# Patient Record
Sex: FEMALE | Race: WHITE | NOT HISPANIC OR LATINO
[De-identification: names, ages, dates, MRNs, and addresses within clinical notes are randomized per-mention and may not be internally consistent; named-entity substitution may affect disease eponyms.]

---
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
## 2020-09-13 ENCOUNTER — TRANSCRIPTION ENCOUNTER (OUTPATIENT)
Age: 20
End: 2020-09-13

## 2021-11-10 ENCOUNTER — INPATIENT (INPATIENT)
Facility: HOSPITAL | Age: 21
LOS: 1 days | Discharge: HOME | DRG: 310 | End: 2021-11-12
Attending: INTERNAL MEDICINE | Admitting: INTERNAL MEDICINE
Payer: COMMERCIAL

## 2021-11-10 VITALS
WEIGHT: 100.09 LBS | SYSTOLIC BLOOD PRESSURE: 113 MMHG | OXYGEN SATURATION: 100 % | TEMPERATURE: 98 F | HEART RATE: 140 BPM | DIASTOLIC BLOOD PRESSURE: 70 MMHG | RESPIRATION RATE: 16 BRPM

## 2021-11-10 LAB
APPEARANCE UR: CLEAR — SIGNIFICANT CHANGE UP
BACTERIA # UR AUTO: NEGATIVE — SIGNIFICANT CHANGE UP
BILIRUB UR-MCNC: NEGATIVE — SIGNIFICANT CHANGE UP
COLOR SPEC: COLORLESS — SIGNIFICANT CHANGE UP
DIFF PNL FLD: NEGATIVE — SIGNIFICANT CHANGE UP
EPI CELLS # UR: 2 /HPF — SIGNIFICANT CHANGE UP
GAS PNL BLDV: SIGNIFICANT CHANGE UP
GLUCOSE UR QL: NEGATIVE — SIGNIFICANT CHANGE UP
HYALINE CASTS # UR AUTO: 0 /LPF — SIGNIFICANT CHANGE UP (ref 0–2)
KETONES UR-MCNC: ABNORMAL
LEUKOCYTE ESTERASE UR-ACNC: NEGATIVE — SIGNIFICANT CHANGE UP
NITRITE UR-MCNC: NEGATIVE — SIGNIFICANT CHANGE UP
PH UR: 7 — SIGNIFICANT CHANGE UP (ref 5–8)
PROT UR-MCNC: NEGATIVE — SIGNIFICANT CHANGE UP
RBC CASTS # UR COMP ASSIST: 1 /HPF — SIGNIFICANT CHANGE UP (ref 0–4)
SP GR SPEC: 1.02 — SIGNIFICANT CHANGE UP (ref 1.01–1.02)
TROPONIN T, HIGH SENSITIVITY RESULT: <6 NG/L — SIGNIFICANT CHANGE UP (ref 0–51)
UROBILINOGEN FLD QL: NEGATIVE — SIGNIFICANT CHANGE UP
WBC UR QL: 2 /HPF — SIGNIFICANT CHANGE UP (ref 0–5)

## 2021-11-10 PROCEDURE — 93010 ELECTROCARDIOGRAM REPORT: CPT

## 2021-11-10 PROCEDURE — 99220: CPT | Mod: 25

## 2021-11-10 PROCEDURE — 71275 CT ANGIOGRAPHY CHEST: CPT | Mod: 26

## 2021-11-10 RX ORDER — VANCOMYCIN HCL 1 G
1000 VIAL (EA) INTRAVENOUS ONCE
Refills: 0 | Status: COMPLETED | OUTPATIENT
Start: 2021-11-10 | End: 2021-11-10

## 2021-11-10 RX ORDER — SODIUM CHLORIDE 9 MG/ML
2000 INJECTION INTRAMUSCULAR; INTRAVENOUS; SUBCUTANEOUS ONCE
Refills: 0 | Status: COMPLETED | OUTPATIENT
Start: 2021-11-10 | End: 2021-11-10

## 2021-11-10 RX ORDER — ACETAMINOPHEN 500 MG
975 TABLET ORAL ONCE
Refills: 0 | Status: COMPLETED | OUTPATIENT
Start: 2021-11-10 | End: 2021-11-10

## 2021-11-10 RX ORDER — PIPERACILLIN AND TAZOBACTAM 4; .5 G/20ML; G/20ML
3.38 INJECTION, POWDER, LYOPHILIZED, FOR SOLUTION INTRAVENOUS ONCE
Refills: 0 | Status: COMPLETED | OUTPATIENT
Start: 2021-11-10 | End: 2021-11-10

## 2021-11-10 RX ADMIN — PIPERACILLIN AND TAZOBACTAM 200 GRAM(S): 4; .5 INJECTION, POWDER, LYOPHILIZED, FOR SOLUTION INTRAVENOUS at 20:30

## 2021-11-10 RX ADMIN — Medication 250 MILLIGRAM(S): at 22:10

## 2021-11-10 RX ADMIN — Medication 975 MILLIGRAM(S): at 20:30

## 2021-11-10 RX ADMIN — SODIUM CHLORIDE 2000 MILLILITER(S): 9 INJECTION INTRAMUSCULAR; INTRAVENOUS; SUBCUTANEOUS at 20:30

## 2021-11-10 NOTE — ED PROVIDER NOTE - CLINICAL SUMMARY MEDICAL DECISION MAKING FREE TEXT BOX
Attending MD Isidro : 20 F with tachycardia and tachypnea since Monday with syncopal episode today. Will take emergently to CT to eval for PE given presentation and high probability of decompensation. Rectal temp 100.7. Will empirically cover for sepsis and obtain Cx.

## 2021-11-10 NOTE — ED PROVIDER NOTE - PHYSICAL EXAMINATION
Attending MD Isidro :   PHYSICAL EXAM:    GENERAL: Very anxious.   HEENT:  Atraumatic  CHEST/LUNG: Chest rise equal bilaterally. CTAB. Tachypneic without retractions or increased WOB.   HEART: Regular rate and rhythm. Tachycardic to 160s.   ABDOMEN: Soft, Nontender, Nondistended  EXTREMITIES:  Extremities warm  PSYCH: A&Ox3  SKIN: No obvious rashes or lesions

## 2021-11-10 NOTE — ED PROVIDER NOTE - OBJECTIVE STATEMENT
20 F p/w syncopal episode and increasing dyspnea and SOB x 3 days. Patient had been feeling this way and got progressively worse. TOday while she was laying on her bed she passed out and regained consciousness. No hx of seizure d/o. Has never had a syncopal episode in the past.     Takes OCPs. No tampons. LMP 2 weeks ago. No cough, dysuria, hematuria. fevers or chills. No nausea, vomiting. No hx of DVT/PE.

## 2021-11-10 NOTE — ED PROVIDER NOTE - PROGRESS NOTE DETAILS
Attending MD Isidro : Patient appears well. HR improved to 110s - 120s. No etiology of fever or tachypnea at this time. Negative trop, EKG and CTA. Pending Ddx includes thyroid d/o, viral infection. WIll place in CDU for close observation. Spoke with pt, comfortable with plan for observation overnight, accompanied by her mother who also agrees with the plan.  Keanu

## 2021-11-11 DIAGNOSIS — R00.0 TACHYCARDIA, UNSPECIFIED: ICD-10-CM

## 2021-11-11 LAB
ALBUMIN SERPL ELPH-MCNC: 4.1 G/DL — SIGNIFICANT CHANGE UP (ref 3.3–5)
ALP SERPL-CCNC: 39 U/L — LOW (ref 40–120)
ALT FLD-CCNC: 10 U/L — SIGNIFICANT CHANGE UP (ref 10–45)
ANION GAP SERPL CALC-SCNC: 14 MMOL/L — SIGNIFICANT CHANGE UP (ref 5–17)
AST SERPL-CCNC: 13 U/L — SIGNIFICANT CHANGE UP (ref 10–40)
BASE EXCESS BLDV CALC-SCNC: -4.1 MMOL/L — LOW (ref -2–2)
BASOPHILS # BLD AUTO: 0.03 K/UL — SIGNIFICANT CHANGE UP (ref 0–0.2)
BASOPHILS NFR BLD AUTO: 0.5 % — SIGNIFICANT CHANGE UP (ref 0–2)
BILIRUB SERPL-MCNC: 0.7 MG/DL — SIGNIFICANT CHANGE UP (ref 0.2–1.2)
BUN SERPL-MCNC: 6 MG/DL — LOW (ref 7–23)
CA-I SERPL-SCNC: 1.23 MMOL/L — SIGNIFICANT CHANGE UP (ref 1.15–1.33)
CALCIUM SERPL-MCNC: 8.6 MG/DL — SIGNIFICANT CHANGE UP (ref 8.4–10.5)
CHLORIDE BLDV-SCNC: 109 MMOL/L — HIGH (ref 96–108)
CHLORIDE SERPL-SCNC: 108 MMOL/L — SIGNIFICANT CHANGE UP (ref 96–108)
CO2 BLDV-SCNC: 24 MMOL/L — SIGNIFICANT CHANGE UP (ref 22–26)
CO2 SERPL-SCNC: 19 MMOL/L — LOW (ref 22–31)
CREAT SERPL-MCNC: 0.56 MG/DL — SIGNIFICANT CHANGE UP (ref 0.5–1.3)
EOSINOPHIL # BLD AUTO: 0.02 K/UL — SIGNIFICANT CHANGE UP (ref 0–0.5)
EOSINOPHIL NFR BLD AUTO: 0.3 % — SIGNIFICANT CHANGE UP (ref 0–6)
GAS PNL BLDV: 138 MMOL/L — SIGNIFICANT CHANGE UP (ref 136–145)
GAS PNL BLDV: SIGNIFICANT CHANGE UP
GAS PNL BLDV: SIGNIFICANT CHANGE UP
GLUCOSE BLDV-MCNC: 86 MG/DL — SIGNIFICANT CHANGE UP (ref 70–99)
GLUCOSE SERPL-MCNC: 85 MG/DL — SIGNIFICANT CHANGE UP (ref 70–99)
HCO3 BLDV-SCNC: 22 MMOL/L — SIGNIFICANT CHANGE UP (ref 22–29)
HCT VFR BLD CALC: 37.1 % — SIGNIFICANT CHANGE UP (ref 34.5–45)
HCT VFR BLDA CALC: 39 % — SIGNIFICANT CHANGE UP (ref 34.5–46.5)
HGB BLD CALC-MCNC: 12.9 G/DL — SIGNIFICANT CHANGE UP (ref 11.7–16.1)
HGB BLD-MCNC: 12.5 G/DL — SIGNIFICANT CHANGE UP (ref 11.5–15.5)
IMM GRANULOCYTES NFR BLD AUTO: 0.3 % — SIGNIFICANT CHANGE UP (ref 0–1.5)
LACTATE BLDV-MCNC: 2.3 MMOL/L — HIGH (ref 0.7–2)
LYMPHOCYTES # BLD AUTO: 1.86 K/UL — SIGNIFICANT CHANGE UP (ref 1–3.3)
LYMPHOCYTES # BLD AUTO: 31.1 % — SIGNIFICANT CHANGE UP (ref 13–44)
MCHC RBC-ENTMCNC: 30.4 PG — SIGNIFICANT CHANGE UP (ref 27–34)
MCHC RBC-ENTMCNC: 33.7 GM/DL — SIGNIFICANT CHANGE UP (ref 32–36)
MCV RBC AUTO: 90.3 FL — SIGNIFICANT CHANGE UP (ref 80–100)
MONOCYTES # BLD AUTO: 0.54 K/UL — SIGNIFICANT CHANGE UP (ref 0–0.9)
MONOCYTES NFR BLD AUTO: 9 % — SIGNIFICANT CHANGE UP (ref 2–14)
NEUTROPHILS # BLD AUTO: 3.52 K/UL — SIGNIFICANT CHANGE UP (ref 1.8–7.4)
NEUTROPHILS NFR BLD AUTO: 58.8 % — SIGNIFICANT CHANGE UP (ref 43–77)
NRBC # BLD: 0 /100 WBCS — SIGNIFICANT CHANGE UP (ref 0–0)
PCO2 BLDV: 44 MMHG — HIGH (ref 39–42)
PH BLDV: 7.31 — LOW (ref 7.32–7.43)
PLATELET # BLD AUTO: 168 K/UL — SIGNIFICANT CHANGE UP (ref 150–400)
PO2 BLDV: 46 MMHG — HIGH (ref 25–45)
POTASSIUM BLDV-SCNC: 3.6 MMOL/L — SIGNIFICANT CHANGE UP (ref 3.5–5.1)
POTASSIUM SERPL-MCNC: 3.6 MMOL/L — SIGNIFICANT CHANGE UP (ref 3.5–5.3)
POTASSIUM SERPL-SCNC: 3.6 MMOL/L — SIGNIFICANT CHANGE UP (ref 3.5–5.3)
PROT SERPL-MCNC: 6.4 G/DL — SIGNIFICANT CHANGE UP (ref 6–8.3)
RAPID RVP RESULT: SIGNIFICANT CHANGE UP
RBC # BLD: 4.11 M/UL — SIGNIFICANT CHANGE UP (ref 3.8–5.2)
RBC # FLD: 12.3 % — SIGNIFICANT CHANGE UP (ref 10.3–14.5)
SAO2 % BLDV: 78.8 % — SIGNIFICANT CHANGE UP (ref 67–88)
SARS-COV-2 RNA SPEC QL NAA+PROBE: SIGNIFICANT CHANGE UP
SODIUM SERPL-SCNC: 141 MMOL/L — SIGNIFICANT CHANGE UP (ref 135–145)
TSH SERPL-MCNC: 1.64 UIU/ML — SIGNIFICANT CHANGE UP (ref 0.27–4.2)
WBC # BLD: 5.99 K/UL — SIGNIFICANT CHANGE UP (ref 3.8–10.5)
WBC # FLD AUTO: 5.99 K/UL — SIGNIFICANT CHANGE UP (ref 3.8–10.5)

## 2021-11-11 PROCEDURE — 93306 TTE W/DOPPLER COMPLETE: CPT | Mod: 26

## 2021-11-11 PROCEDURE — 99217: CPT

## 2021-11-11 RX ORDER — NADOLOL 80 MG/1
10 TABLET ORAL DAILY
Refills: 0 | Status: DISCONTINUED | OUTPATIENT
Start: 2021-11-11 | End: 2021-11-12

## 2021-11-11 RX ORDER — SODIUM CHLORIDE 9 MG/ML
1000 INJECTION INTRAMUSCULAR; INTRAVENOUS; SUBCUTANEOUS
Refills: 0 | Status: DISCONTINUED | OUTPATIENT
Start: 2021-11-10 | End: 2021-11-12

## 2021-11-11 RX ADMIN — SODIUM CHLORIDE 110 MILLILITER(S): 9 INJECTION INTRAMUSCULAR; INTRAVENOUS; SUBCUTANEOUS at 01:09

## 2021-11-11 RX ADMIN — NADOLOL 10 MILLIGRAM(S): 80 TABLET ORAL at 07:56

## 2021-11-11 NOTE — CONSULT NOTE ADULT - SUBJECTIVE AND OBJECTIVE BOX
CHIEF COMPLAINT:Patient is a 20y old  Female who presents with a chief complaint of     HISTORY OF PRESENT ILLNESS:    20 year old female with no history presents with generally not feeling well and palpitation noted to have elevated HR   she has minimal chest discomfort but no chest pain   no significant sob  no dizziness   no syncope     PAST MEDICAL & SURGICAL HISTORY:  No pertinent past medical history    No significant past surgical history            MEDICATIONS:              sodium chloride 0.9%. 1000 milliLiter(s) IV Continuous <Continuous>      FAMILY HISTORY:  No pertinent family history in first degree relatives        Non-contributory    SOCIAL HISTORY:    No tobacco, drugs or etoh    Allergies    No Known Allergies    Intolerances    	    REVIEW OF SYSTEMS:  as above  The rest of the 14 points ROS reviewed and except above they are unremarkable.        PHYSICAL EXAM:  T(C): 37 (11-11-21 @ 05:04), Max: 37 (11-11-21 @ 05:04)  HR: 122 (11-11-21 @ 05:04) (99 - 140)  BP: 128/81 (11-11-21 @ 05:04) (113/70 - 128/81)  RR: 18 (11-11-21 @ 05:04) (16 - 22)  SpO2: 100% (11-11-21 @ 05:04) (100% - 100%)  Wt(kg): --  I&O's Summary      JVP: Normal  Neck: supple  Lung: clear   CV: S1 S2 , Murmur:  Abd: soft  Ext: No edema  neuro: Awake / alert  Psych: flat affect  Skin: normal      LABS/DATA:    TELEMETRY: 	    ECG:  	   	  CARDIAC MARKERS:                        <6 <<== 11-10-21 @ 22:27                <6 <<== 11-10-21 @ 19:44                              12.5   5.99  )-----------( 168      ( 11 Nov 2021 06:33 )             37.1     11-10    137  |  102  |  10  ----------------------------<  97  3.6   |  16<L>  |  0.60    Ca    9.6      10 Nov 2021 19:44    TPro  7.5  /  Alb  4.6  /  TBili  0.6  /  DBili  x   /  AST  18  /  ALT  14  /  AlkPhos  46  11-10    proBNP: Serum Pro-Brain Natriuretic Peptide: 32 pg/mL (11-10 @ 19:44)    Lipid Profile:   HgA1c:   TSH: Thyroid Stimulating Hormone, Serum: 1.64 uIU/mL (11-11 @ 02:13)

## 2021-11-11 NOTE — ED CDU PROVIDER SUBSEQUENT DAY NOTE - ATTENDING CONTRIBUTION TO CARE
Patient in sinus tach, given IVF/Vanc/Zosyn with improvement in vitals. CTA negative for PE. Patient persistently tachycardic in the ED, although improving. Trop <6,<6. No clear source of potential infection. Plan for continued hydration and tele monitoring in CDU. RVP pending. In CDU patient persistantly tachycardic admitted for further work up.

## 2021-11-11 NOTE — H&P ADULT - NSHPLABSRESULTS_GEN_ALL_CORE
Lab Results:  CBC  CBC Full  -  ( 2021 06:33 )  WBC Count : 5.99 K/uL  RBC Count : 4.11 M/uL  Hemoglobin : 12.5 g/dL  Hematocrit : 37.1 %  Platelet Count - Automated : 168 K/uL  Mean Cell Volume : 90.3 fl  Mean Cell Hemoglobin : 30.4 pg  Mean Cell Hemoglobin Concentration : 33.7 gm/dL  Auto Neutrophil # : 3.52 K/uL  Auto Lymphocyte # : 1.86 K/uL  Auto Monocyte # : 0.54 K/uL  Auto Eosinophil # : 0.02 K/uL  Auto Basophil # : 0.03 K/uL  Auto Neutrophil % : 58.8 %  Auto Lymphocyte % : 31.1 %  Auto Monocyte % : 9.0 %  Auto Eosinophil % : 0.3 %  Auto Basophil % : 0.5 %    .		Differential:	[] Automated		[] Manual  Chemistry                        12.5   5.99  )-----------( 168      ( 2021 06:33 )             37.1     11-    141  |  108  |  6<L>  ----------------------------<  85  3.6   |  19<L>  |  0.56    Ca    8.6      2021 06:33    TPro  6.4  /  Alb  4.1  /  TBili  0.7  /  DBili  x   /  AST  13  /  ALT  10  /  AlkPhos  39<L>  11-11    LIVER FUNCTIONS - ( 2021 06:33 )  Alb: 4.1 g/dL / Pro: 6.4 g/dL / ALK PHOS: 39 U/L / ALT: 10 U/L / AST: 13 U/L / GGT: x           PT/INR - ( 10 Nov 2021 19:44 )   PT: 12.3 sec;   INR: 1.03 ratio         PTT - ( 10 Nov 2021 19:44 )  PTT:28.7 sec  Urinalysis Basic - ( 10 Nov 2021 21:46 )    Color: Colorless / Appearance: Clear / S.021 / pH: x  Gluc: x / Ketone: Small  / Bili: Negative / Urobili: Negative   Blood: x / Protein: Negative / Nitrite: Negative   Leuk Esterase: Negative / RBC: 1 /hpf / WBC 2 /HPF   Sq Epi: x / Non Sq Epi: 2 /hpf / Bacteria: Negative            MICROBIOLOGY/CULTURES:      RADIOLOGY RESULTS: Reviewed

## 2021-11-11 NOTE — CONSULT NOTE ADULT - ASSESSMENT
Sinus tachycardia  ruled out for anemia, PE, Hyperthyroid, MI   no sign of infection     covid vaccine in april and flu shot 2 weeks ago   HR laying 110 upon standing 150s but no drop in BP   I suspect pt has POTS possible from post flu shot  if chest pain gets worse or recurrent then we need to think about pericarditis or SCAD ( however it does not present with rapid HR as such )     recommend   monitor on tele   Echo   start low dose non selective BB , will start nadolol 10 daily ( 20mg 1/2 tab daily )   will consider midodrine  OOB to chair all time   check ESR     discussed with ED, family at bedside

## 2021-11-11 NOTE — ED CDU PROVIDER DISPOSITION NOTE - CLINICAL COURSE
20 F on OCPs p/w syncopal episode and chest discomfort and SOB x5 days. Patient had been feeling this way and got progressively worse. States that she feels like her heart is racing. Also endorses lightheadedness. Today while she was laying on her bed she passed out and regained consciousness. No hx of seizure d/o. Has never had a syncopal episode in the past. States that she had neck pain a few days ago, although denies at this time. LMP 2 weeks ago. No cough, dysuria, hematuria. fevers or chills. Roommate at home feels well. No nausea, vomiting. No hx of DVT/PE.  	PMD Maria Dye   ED course: Patient in sinus tach, given IVF/Vanc/Zosyn with improvement in vitals. CTA negative for PE. Patient persistently tachycardic in the ED, although improving. Trop <6,<6. No clear source of potential infection. Plan for continued hydration and tele monitoring in CDU. RVP pending. 20 F on OCPs p/w syncopal episode and chest discomfort and SOB x5 days. Patient had been feeling this way and got progressively worse. States that she feels like her heart is racing. Also endorses lightheadedness. Today while she was laying on her bed she passed out and regained consciousness. No hx of seizure d/o. Has never had a syncopal episode in the past. States that she had neck pain a few days ago, although denies at this time. LMP 2 weeks ago. No cough, dysuria, hematuria. fevers or chills. Roommate at home feels well. No nausea, vomiting. No hx of DVT/PE.  	PMD Maria Dye   ED course: Patient in sinus tach, given IVF/Vanc/Zosyn with improvement in vitals. CTA negative for PE. Patient persistently tachycardic in the ED, although improving. Trop <6,<6. No clear source of potential infection. Plan for continued hydration and tele monitoring in CDU. RVP pending. In CDU patient persistantly tachycardic. Evaluated by Dr. Lindsey, who recommends admission to Dr. Roy. Discussed with Dr. Marin.

## 2021-11-11 NOTE — ED CDU PROVIDER DISPOSITION NOTE - NSFOLLOWUPINSTRUCTIONS_ED_ALL_ED_FT
Hydrate.     You may be contacted by our Emergency Department Referrals Coordinator to set up your follow up appointment within 24-48 hours of your discharge Monday- Friday. We recommend you follow up with your primary care provider within the next 2-3 days, please bring all of your results with you.     Please return to the Emergency Department with new, worsening, or concerning symptoms, such as:  -Shortness of breath or trouble breathing  -Pressure, pain, tightness in chest  -Facial drooping, arm weakness, or speech difficulty   -Head injury or loss of consciousness   -Nonstop bleeding or an open wound     *More detailed information regarding your visit and discharge can be found by reviewing this packet

## 2021-11-11 NOTE — ED CDU PROVIDER INITIAL DAY NOTE - PHYSICAL EXAMINATION
GEN: Well Appearing, Nontoxic, NAD  HEENT: NC/AT, Symm Facies. PERRL, EOMI, FROM of c-spine. No midline c-spine tenderness to palpation   CV: No JVD/Bruits or stridor;  +S1S2, RRR w/o m/g/r  RESP: CTAB w/o w/r/r  ABD: Soft, nt/nd, +BS. No guarding/rebound. No RUQ tender, no CVAT  EXT/MSK: No lower extremity edema or calf tenderness. FROMx4  PSCYH: Appropriate mood and affect   Neuro: Grossly intact, AOX3 with normal speech, sensation grossly intact, strength equal b/l UE/LE 5/5, steady gait

## 2021-11-11 NOTE — H&P ADULT - NSHPPHYSICALEXAM_GEN_ALL_CORE
General: WN/WD NAD  PERRLA  Neurology: A&Ox3, nonfocal, RICCI x 4  Respiratory: CTA B/L  CV: RRR, S1S2, no murmurs, rubs or gallops  Abdominal: Soft, NT, ND +BS, Last BM  Extremities: No edema, + peripheral pulses  Skin Normal

## 2021-11-11 NOTE — ED CDU PROVIDER SUBSEQUENT DAY NOTE - HISTORY
No interval changes since initial CDU provider note. Pt without complaint. NAD VSS. HR improving-90s in sinus. Plan to continue tele monitoring and hydration in the setting of tachycardia/SOB/CP. Reyes Matson

## 2021-11-11 NOTE — ED CDU PROVIDER INITIAL DAY NOTE - DETAILS
Tachycardia/CP  -Tele  -IVF  -Repeat labs  -DW ED team, vitals every 4 hours, frequent reevaluations

## 2021-11-11 NOTE — ED CDU PROVIDER INITIAL DAY NOTE - MEDICAL DECISION MAKING DETAILS
Attending MD Isidro : Pt presenting with syncope and SOB with profound tachycardia. R/o PE vs NSTEMI. Patient placed in CDU for persistent albeit improved tachycardia. Plan for echocardiogram and for cardiology eval to further risk stratify patient.

## 2021-11-11 NOTE — ED ADULT NURSE REASSESSMENT NOTE - NS ED NURSE REASSESS COMMENT FT1
Patient labs drawn, IV replaced as it was causing patient pain. Patient has no complaints, denies pain and nausea at this time. Maintenance fluids continued. Bed locked and in lowest position for safety.
Patient's vitals stable with no complaints at this time. Denies pain, mother at bedside.
0800 - Alert and oriented X 4, follows command and independent.  Denies pain, chest pain, and N/V.  Neuro intact, PERRLA, and strong upper and lower extremities.  Patient HR was tachy to 105;  given nadolol.  Patient admitted and just waiting on bed to go upstairs.

## 2021-11-11 NOTE — ED CDU PROVIDER INITIAL DAY NOTE - NS ED ROS FT
Constitutional: No fever or chills +lightheadedness   Eyes: No visual changes, eye pain   CV: + chest pain +palpitations No lower extremity edema  Resp: +SOB no cough  GI: No abd pain. No nausea or vomiting. No diarrhea.   : No dysuria, hematuria.   MSK: No musculoskeletal pain  Skin: No rash  Psych: No complaints   Neuro: No headache. No numbness or tingling.   Endo: No DM

## 2021-11-11 NOTE — ED CDU PROVIDER SUBSEQUENT DAY NOTE - PROGRESS NOTE DETAILS
While sleeping patient HR in the 70s, sinus. After waking up HR increased to 100-120, sinus tachycardia. Patient denies SOB, denies chest pain. Spoke with cardiologist Dr. Lindsey, who will see patient in CDU. - Angelica Matson PA-C Patient seen by Dr. Lindsey, concern for POTS. Recommending admission to Dr. Roy, spoke with Dr. Roy who accepts admission. - Angelica Matson PA-C

## 2021-11-11 NOTE — H&P ADULT - ASSESSMENT
20 F on OCPs p/w syncopal episode and chest discomfort and SOB x5 days. Patient had been feeling this way and got progressively worse. States that she feels like her heart is racing. Also endorses lightheadedness. Today while she was laying on her bed she passed out and regained consciousness. No hx of seizure d/o. Has never had a syncopal episode in the past. States that she had neck pain a few days ago, although denies at this time. LMP 2 weeks ago. No cough, dysuria, hematuria. fevers or chills. Roommate at home feels well. No nausea, vomiting. No hx of DVT/PE.    1 Chest discmfort   monitor on tele   Echo   start low dose non selective BB , will start nadolol 10 daily ( 20mg 1/2 tab daily )   will consider midodrine  OOB to chair all time   ro POTS     2 Lovenox for DVT prophylaxis

## 2021-11-11 NOTE — ED PROCEDURE NOTE - PROCEDURE ADDITIONAL DETAILS
Emergency Department Focused Ultrasound performed at patient's bedside for educational purposes. An appropriate follow up study is ordered.

## 2021-11-11 NOTE — H&P ADULT - HISTORY OF PRESENT ILLNESS
20 F on OCPs p/w syncopal episode and chest discomfort and SOB x5 days. Patient had been feeling this way and got progressively worse. States that she feels like her heart is racing. Also endorses lightheadedness. Today while she was laying on her bed she passed out and regained consciousness. No hx of seizure d/o. Has never had a syncopal episode in the past. States that she had neck pain a few days ago, although denies at this time. LMP 2 weeks ago. No cough, dysuria, hematuria. fevers or chills. Roommate at home feels well. No nausea, vomiting. No hx of DVT/PE.

## 2021-11-11 NOTE — ED CDU PROVIDER INITIAL DAY NOTE - OBJECTIVE STATEMENT
20 F on OCPs p/w syncopal episode and chest discomfort and SOB x5 days. Patient had been feeling this way and got progressively worse. States that she feels like her heart is racing. Also endorses lightheadedness. Today while she was laying on her bed she passed out and regained consciousness. No hx of seizure d/o. Has never had a syncopal episode in the past. States that she had next pain a few days ago, although denies at this time. LMP 2 weeks ago. No cough, dysuria, hematuria. fevers or chills. Roommate at home feels well. No nausea, vomiting. No hx of DVT/PE.  PMD Maria Dye   ED course: Patient in sinus tach, given IVF/Vanc/Zosyn with improvement in vitals. CTA negative for PE. Patient persistently tachycardic in the ED, although improving. Trop <6,<6. No clear source of potential infection. Plan for continued hydration and tele monitoring in CDU. RVP pending. 20 F on OCPs p/w syncopal episode and chest discomfort and SOB x5 days. Patient had been feeling this way and got progressively worse. States that she feels like her heart is racing. Also endorses lightheadedness. Today while she was laying on her bed she passed out and regained consciousness. No hx of seizure d/o. Has never had a syncopal episode in the past. States that she had neck pain a few days ago, although denies at this time. LMP 2 weeks ago. No cough, dysuria, hematuria. fevers or chills. Roommate at home feels well. No nausea, vomiting. No hx of DVT/PE.  PMD Maria Dye   ED course: Patient in sinus tach, given IVF/Vanc/Zosyn with improvement in vitals. CTA negative for PE. Patient persistently tachycardic in the ED, although improving. Trop <6,<6. No clear source of potential infection. Plan for continued hydration and tele monitoring in CDU. RVP pending.

## 2021-11-12 ENCOUNTER — TRANSCRIPTION ENCOUNTER (OUTPATIENT)
Age: 21
End: 2021-11-12

## 2021-11-12 VITALS
DIASTOLIC BLOOD PRESSURE: 83 MMHG | TEMPERATURE: 98 F | HEART RATE: 85 BPM | SYSTOLIC BLOOD PRESSURE: 119 MMHG | OXYGEN SATURATION: 99 % | RESPIRATION RATE: 18 BRPM

## 2021-11-12 LAB
ALBUMIN SERPL ELPH-MCNC: 3.9 G/DL — SIGNIFICANT CHANGE UP (ref 3.3–5)
ALP SERPL-CCNC: 34 U/L — LOW (ref 40–120)
ALT FLD-CCNC: 9 U/L — LOW (ref 10–45)
ANION GAP SERPL CALC-SCNC: 10 MMOL/L — SIGNIFICANT CHANGE UP (ref 5–17)
AST SERPL-CCNC: 13 U/L — SIGNIFICANT CHANGE UP (ref 10–40)
BILIRUB SERPL-MCNC: 0.4 MG/DL — SIGNIFICANT CHANGE UP (ref 0.2–1.2)
BUN SERPL-MCNC: 5 MG/DL — LOW (ref 7–23)
CALCIUM SERPL-MCNC: 8.7 MG/DL — SIGNIFICANT CHANGE UP (ref 8.4–10.5)
CHLORIDE SERPL-SCNC: 110 MMOL/L — HIGH (ref 96–108)
CO2 SERPL-SCNC: 20 MMOL/L — LOW (ref 22–31)
COVID-19 NUCLEOCAPSID GAM AB INTERP: NEGATIVE — SIGNIFICANT CHANGE UP
COVID-19 NUCLEOCAPSID TOTAL GAM ANTIBODY RESULT: 0.09 INDEX — SIGNIFICANT CHANGE UP
COVID-19 SPIKE DOMAIN AB INTERP: POSITIVE
COVID-19 SPIKE DOMAIN ANTIBODY RESULT: >250 U/ML — HIGH
CREAT SERPL-MCNC: 0.55 MG/DL — SIGNIFICANT CHANGE UP (ref 0.5–1.3)
CRP SERPL-MCNC: <3 MG/L — SIGNIFICANT CHANGE UP (ref 0–4)
CULTURE RESULTS: NO GROWTH — SIGNIFICANT CHANGE UP
ERYTHROCYTE [SEDIMENTATION RATE] IN BLOOD: 2 MM/HR — SIGNIFICANT CHANGE UP (ref 0–15)
GLUCOSE SERPL-MCNC: 87 MG/DL — SIGNIFICANT CHANGE UP (ref 70–99)
HCT VFR BLD CALC: 34.9 % — SIGNIFICANT CHANGE UP (ref 34.5–45)
HGB BLD-MCNC: 11.6 G/DL — SIGNIFICANT CHANGE UP (ref 11.5–15.5)
MCHC RBC-ENTMCNC: 30.5 PG — SIGNIFICANT CHANGE UP (ref 27–34)
MCHC RBC-ENTMCNC: 33.2 GM/DL — SIGNIFICANT CHANGE UP (ref 32–36)
MCV RBC AUTO: 91.8 FL — SIGNIFICANT CHANGE UP (ref 80–100)
NRBC # BLD: 0 /100 WBCS — SIGNIFICANT CHANGE UP (ref 0–0)
PLATELET # BLD AUTO: 142 K/UL — LOW (ref 150–400)
POTASSIUM SERPL-MCNC: 3.9 MMOL/L — SIGNIFICANT CHANGE UP (ref 3.5–5.3)
POTASSIUM SERPL-SCNC: 3.9 MMOL/L — SIGNIFICANT CHANGE UP (ref 3.5–5.3)
PROT SERPL-MCNC: 5.9 G/DL — LOW (ref 6–8.3)
RBC # BLD: 3.8 M/UL — SIGNIFICANT CHANGE UP (ref 3.8–5.2)
RBC # FLD: 12.4 % — SIGNIFICANT CHANGE UP (ref 10.3–14.5)
SARS-COV-2 IGG+IGM SERPL QL IA: 0.09 INDEX — SIGNIFICANT CHANGE UP
SARS-COV-2 IGG+IGM SERPL QL IA: >250 U/ML — HIGH
SARS-COV-2 IGG+IGM SERPL QL IA: NEGATIVE — SIGNIFICANT CHANGE UP
SARS-COV-2 IGG+IGM SERPL QL IA: POSITIVE
SODIUM SERPL-SCNC: 140 MMOL/L — SIGNIFICANT CHANGE UP (ref 135–145)
SPECIMEN SOURCE: SIGNIFICANT CHANGE UP
TSH SERPL-MCNC: 3.38 UIU/ML — SIGNIFICANT CHANGE UP (ref 0.27–4.2)
WBC # BLD: 4.15 K/UL — SIGNIFICANT CHANGE UP (ref 3.8–10.5)
WBC # FLD AUTO: 4.15 K/UL — SIGNIFICANT CHANGE UP (ref 3.8–10.5)

## 2021-11-12 PROCEDURE — 85025 COMPLETE CBC W/AUTO DIFF WBC: CPT

## 2021-11-12 PROCEDURE — 80053 COMPREHEN METABOLIC PANEL: CPT

## 2021-11-12 PROCEDURE — 96375 TX/PRO/DX INJ NEW DRUG ADDON: CPT

## 2021-11-12 PROCEDURE — 85652 RBC SED RATE AUTOMATED: CPT

## 2021-11-12 PROCEDURE — 82947 ASSAY GLUCOSE BLOOD QUANT: CPT

## 2021-11-12 PROCEDURE — 82330 ASSAY OF CALCIUM: CPT

## 2021-11-12 PROCEDURE — 99285 EMERGENCY DEPT VISIT HI MDM: CPT | Mod: 25

## 2021-11-12 PROCEDURE — 71275 CT ANGIOGRAPHY CHEST: CPT

## 2021-11-12 PROCEDURE — 86140 C-REACTIVE PROTEIN: CPT

## 2021-11-12 PROCEDURE — 84132 ASSAY OF SERUM POTASSIUM: CPT

## 2021-11-12 PROCEDURE — 84702 CHORIONIC GONADOTROPIN TEST: CPT

## 2021-11-12 PROCEDURE — 85018 HEMOGLOBIN: CPT

## 2021-11-12 PROCEDURE — 82565 ASSAY OF CREATININE: CPT

## 2021-11-12 PROCEDURE — 76376 3D RENDER W/INTRP POSTPROCES: CPT

## 2021-11-12 PROCEDURE — 82435 ASSAY OF BLOOD CHLORIDE: CPT

## 2021-11-12 PROCEDURE — 36415 COLL VENOUS BLD VENIPUNCTURE: CPT

## 2021-11-12 PROCEDURE — 84484 ASSAY OF TROPONIN QUANT: CPT

## 2021-11-12 PROCEDURE — 93005 ELECTROCARDIOGRAM TRACING: CPT

## 2021-11-12 PROCEDURE — G0378: CPT

## 2021-11-12 PROCEDURE — 82803 BLOOD GASES ANY COMBINATION: CPT

## 2021-11-12 PROCEDURE — 84295 ASSAY OF SERUM SODIUM: CPT

## 2021-11-12 PROCEDURE — 87040 BLOOD CULTURE FOR BACTERIA: CPT

## 2021-11-12 PROCEDURE — 93306 TTE W/DOPPLER COMPLETE: CPT

## 2021-11-12 PROCEDURE — 85730 THROMBOPLASTIN TIME PARTIAL: CPT

## 2021-11-12 PROCEDURE — 96374 THER/PROPH/DIAG INJ IV PUSH: CPT

## 2021-11-12 PROCEDURE — 87086 URINE CULTURE/COLONY COUNT: CPT

## 2021-11-12 PROCEDURE — 83605 ASSAY OF LACTIC ACID: CPT

## 2021-11-12 PROCEDURE — 0225U NFCT DS DNA&RNA 21 SARSCOV2: CPT

## 2021-11-12 PROCEDURE — 85610 PROTHROMBIN TIME: CPT

## 2021-11-12 PROCEDURE — 85027 COMPLETE CBC AUTOMATED: CPT

## 2021-11-12 PROCEDURE — 85014 HEMATOCRIT: CPT

## 2021-11-12 PROCEDURE — 83880 ASSAY OF NATRIURETIC PEPTIDE: CPT

## 2021-11-12 PROCEDURE — 84443 ASSAY THYROID STIM HORMONE: CPT

## 2021-11-12 PROCEDURE — 86769 SARS-COV-2 COVID-19 ANTIBODY: CPT

## 2021-11-12 PROCEDURE — 81001 URINALYSIS AUTO W/SCOPE: CPT

## 2021-11-12 RX ORDER — NADOLOL 80 MG/1
0.5 TABLET ORAL
Qty: 15 | Refills: 0
Start: 2021-11-12 | End: 2021-12-11

## 2021-11-12 RX ADMIN — NADOLOL 10 MILLIGRAM(S): 80 TABLET ORAL at 05:59

## 2021-11-12 NOTE — DISCHARGE NOTE PROVIDER - NSDCCPCAREPLAN_GEN_ALL_CORE_FT
PRINCIPAL DISCHARGE DIAGNOSIS  Diagnosis: POTS (postural orthostatic tachycardia syndrome)  Assessment and Plan of Treatment: Follow up with Dr. Lindsey in 1 week  Continue nadolol as prescribed  Please call your doctor if your worsening palpitations, shortness of breath, chest pain.      
[Negative] : Genitourinary

## 2021-11-12 NOTE — PROGRESS NOTE ADULT - SUBJECTIVE AND OBJECTIVE BOX
DATE OF SERVICE: 11-12-21 @ 07:08    Subjective: Patient seen and examined. No new events except as noted.     SUBJECTIVE/ROS:  feels better  No chest pain, dyspnea, palpitation, or dizziness.       MEDICATIONS:  MEDICATIONS  (STANDING):  nadolol 10 milliGRAM(s) Oral daily  sodium chloride 0.9%. 1000 milliLiter(s) (110 mL/Hr) IV Continuous <Continuous>      PHYSICAL EXAM:  T(C): 36.9 (11-12-21 @ 04:48), Max: 37.1 (11-11-21 @ 08:07)  HR: 102 (11-12-21 @ 04:48) (85 - 103)  BP: 110/73 (11-12-21 @ 04:48) (101/70 - 110/73)  RR: 18 (11-12-21 @ 04:48) (16 - 21)  SpO2: 98% (11-12-21 @ 04:48) (98% - 100%)  Wt(kg): --  I&O's Summary    11 Nov 2021 07:01  -  12 Nov 2021 07:00  --------------------------------------------------------  IN: 120 mL / OUT: 0 mL / NET: 120 mL            JVP: Normal  Neck: supple  Lung: clear   CV: S1 S2 , Murmur:  Abd: soft  Ext: No edema  neuro: Awake / alert  Psych: flat affect  Skin: normal``    LABS/DATA:    CARDIAC MARKERS:                                11.6   4.15  )-----------( 142      ( 12 Nov 2021 05:17 )             34.9     11-12    140  |  110<H>  |  5<L>  ----------------------------<  87  3.9   |  20<L>  |  0.55    Ca    8.7      12 Nov 2021 05:17    TPro  5.9<L>  /  Alb  3.9  /  TBili  0.4  /  DBili  x   /  AST  13  /  ALT  9<L>  /  AlkPhos  34<L>  11-12    proBNP:   Lipid Profile:   HgA1c:   TSH:     TELE:  EKG:

## 2021-11-12 NOTE — DISCHARGE NOTE PROVIDER - HOSPITAL COURSE
20 F on OCPs p/w syncopal episode and chest discomfort and SOB x5 days. Patient had been feeling this way and got progressively worse. States that she feels like her heart is racing. Also endorses lightheadedness. Today while she was laying on her bed she passed out and regained consciousness. No hx of seizure d/o. Has never had a syncopal episode in the past. States that she had neck pain a few days ago, although denies at this time. LMP 2 weeks ago. No cough, dysuria, hematuria. fevers or chills. Roommate at home feels well. No nausea, vomiting. No hx of DVT/PE.  Cardiology consulted. Covid vaccine in april and flu shot 2 weeks ago   HR laying 110 upon standing 150s but no drop in BP   Per Cardiology suspected pt has POTS possible from post flu shot  No cp. ESR is normal hence less likely pericarditis   Echo is normal. Started nadolol. Cleared by Dr. Lindsey for discharge and outpatient follow up with him next week.

## 2021-11-12 NOTE — DISCHARGE NOTE NURSING/CASE MANAGEMENT/SOCIAL WORK - PATIENT PORTAL LINK FT
You can access the FollowMyHealth Patient Portal offered by [facility] by registering at the following website: [email or web address]. By joining [facility]’s FollowMyHealth portal, you will also be able to view your health information using other applications (apps) compatible with our system.

## 2021-11-12 NOTE — PROGRESS NOTE ADULT - ASSESSMENT
Sinus tachycardia  ruled out for anemia, PE, Hyperthyroid, MI   no sign of infection     covid vaccine in april and flu shot 2 weeks ago   HR laying 110 upon standing 150s but no drop in BP   I suspect pt has POTS possible from post flu shot  no cp   ESR is normal hence less likely pericarditis     recommend   monitor on tele   Echo is normal   cont nadolol   will consider midodrine  OOB to chair all time     PT can be discharged and follow up with me next week

## 2022-02-28 ENCOUNTER — TRANSCRIPTION ENCOUNTER (OUTPATIENT)
Age: 22
End: 2022-02-28

## 2022-08-22 PROBLEM — Z78.9 OTHER SPECIFIED HEALTH STATUS: Chronic | Status: ACTIVE | Noted: 2021-11-10

## 2022-08-25 PROBLEM — Z00.00 ENCOUNTER FOR PREVENTIVE HEALTH EXAMINATION: Status: ACTIVE | Noted: 2022-08-25

## 2022-09-02 ENCOUNTER — APPOINTMENT (OUTPATIENT)
Dept: CARDIOLOGY | Facility: CLINIC | Age: 22
End: 2022-09-02

## 2022-11-07 ENCOUNTER — TRANSCRIPTION ENCOUNTER (OUTPATIENT)
Age: 22
End: 2022-11-07

## 2022-11-07 ENCOUNTER — APPOINTMENT (OUTPATIENT)
Dept: CARDIOLOGY | Facility: CLINIC | Age: 22
End: 2022-11-07

## 2022-11-07 VITALS
SYSTOLIC BLOOD PRESSURE: 122 MMHG | HEART RATE: 111 BPM | WEIGHT: 103 LBS | BODY MASS INDEX: 18.95 KG/M2 | DIASTOLIC BLOOD PRESSURE: 70 MMHG | HEIGHT: 62 IN

## 2022-11-07 DIAGNOSIS — R55 SYNCOPE AND COLLAPSE: ICD-10-CM

## 2022-11-07 PROCEDURE — 99204 OFFICE O/P NEW MOD 45 MIN: CPT

## 2022-11-07 PROCEDURE — 93000 ELECTROCARDIOGRAM COMPLETE: CPT

## 2022-11-07 RX ORDER — NORGESTIMATE AND ETHINYL ESTRADIOL 7DAYSX3 LO
0.18/0.215/0.25 KIT ORAL
Qty: 84 | Refills: 0 | Status: ACTIVE | COMMUNITY
Start: 2022-06-20

## 2022-11-07 RX ORDER — NADOLOL 20 MG/1
20 TABLET ORAL DAILY
Refills: 0 | Status: ACTIVE | COMMUNITY

## 2022-11-07 RX ORDER — MIDODRINE HYDROCHLORIDE 5 MG/1
5 TABLET ORAL DAILY
Refills: 0 | Status: ACTIVE | COMMUNITY

## 2022-11-07 NOTE — DISCUSSION/SUMMARY
[FreeTextEntry1] : Patient wishes to try a different B-blocker. She is on nadolol but would like to achieve a better control of her HR. Will discontinue nadolol, and a trial of metoprolol ER 25 mg will be attempted.\par Midodrine 5 mg prn for moments when her BP is markedly decreased below 90 mmHg systolic.\par Jobst compression stockings.\par patient was encouraged to drink fluids on a daily basis.\par The patient was advised to maintain her present medications.\par She was advised to lower her T. cholesterol level to less than 200 mg/dl and LDL to less than 100 mg/dl.\par Start an exercise program.\par Maintain a low fat, low cholesterol diet.\par Holter monitor for 5 days.\par EST\par Obtain the patient's prior TTE.\par RV in 2 weeks.\par  Price (Do Not Change): 0.00 Detail Level: Simple Instructions: This plan will send the code FBSE to the PM system.  DO NOT or CHANGE the price.

## 2022-11-07 NOTE — REVIEW OF SYSTEMS
[Dyspnea on exertion] : dyspnea during exertion [Negative] : Heme/Lymph [FreeTextEntry5] : Palpitations, near syncope

## 2022-11-07 NOTE — REASON FOR VISIT
[FreeTextEntry1] : 21 year old WF diagnosed with Postural orthostatic hypotension after a syncopal event last year where she was admitted to [facility] and was placed on nadolol presents for an initial Cardiology evaluation.

## 2022-11-07 NOTE — HISTORY OF PRESENT ILLNESS
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

## 2022-11-08 RX ORDER — METOPROLOL SUCCINATE 25 MG/1
25 TABLET, EXTENDED RELEASE ORAL DAILY
Qty: 90 | Refills: 0 | Status: ACTIVE | COMMUNITY
Start: 1900-01-01 | End: 1900-01-01

## 2022-11-09 ENCOUNTER — APPOINTMENT (OUTPATIENT)
Dept: CARDIOLOGY | Facility: CLINIC | Age: 22
End: 2022-11-09

## 2022-11-09 PROCEDURE — 93242 EXT ECG>48HR<7D RECORDING: CPT

## 2022-12-05 ENCOUNTER — APPOINTMENT (OUTPATIENT)
Dept: CARDIOLOGY | Facility: CLINIC | Age: 22
End: 2022-12-05

## 2022-12-05 PROCEDURE — 93015 CV STRESS TEST SUPVJ I&R: CPT

## 2023-03-15 ENCOUNTER — NON-APPOINTMENT (OUTPATIENT)
Age: 23
End: 2023-03-15

## 2023-04-07 ENCOUNTER — APPOINTMENT (OUTPATIENT)
Dept: CARDIOLOGY | Facility: CLINIC | Age: 23
End: 2023-04-07
Payer: COMMERCIAL

## 2023-04-07 VITALS
WEIGHT: 114 LBS | BODY MASS INDEX: 20.98 KG/M2 | DIASTOLIC BLOOD PRESSURE: 62 MMHG | SYSTOLIC BLOOD PRESSURE: 110 MMHG | HEART RATE: 103 BPM | HEIGHT: 62 IN

## 2023-04-07 PROCEDURE — 93000 ELECTROCARDIOGRAM COMPLETE: CPT

## 2023-04-07 PROCEDURE — 99214 OFFICE O/P EST MOD 30 MIN: CPT

## 2023-04-07 NOTE — ASSESSMENT
[FreeTextEntry1] : Postural orthostatic hypotension\par Sinus tachycardia improved with metoprolol ER

## 2023-04-07 NOTE — REASON FOR VISIT
[FreeTextEntry1] : 21 year old WF diagnosed with Postural orthostatic hypotension after a syncopal event last year where she was admitted to [facility] and was placed on nadolol presents for an initial Cardiology evaluation. She was still symptomatic with a rapid HR on the nadolol and she was placed on metoprolol ER with improvement.

## 2023-04-07 NOTE — HISTORY OF PRESENT ILLNESS
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

## 2023-04-07 NOTE — DISCUSSION/SUMMARY
[FreeTextEntry1] : Patient wishes to try a different B-blocker. She is on nadolol but would like to achieve a better control of her HR. Will discontinue nadolol, and a trial of metoprolol ER 25 mg will be attempted.\par She is tolerating the metoprolol and her HR has imroved with it.\par Midodrine 5 mg prn for moments when her BP is markedly decreased below 90 mmHg systolic.\par Jobst compression stockings.\par patient was encouraged to drink fluids on a daily basis.\par The patient was advised to maintain her present medications.\par She was advised to lower her T. cholesterol level to less than 200 mg/dl and LDL to less than 100 mg/dl.\par Start an exercise program.\par Maintain a low fat, low cholesterol diet.\par She did not go for lab testing yet.\par RV in 1 year\par  [EKG obtained to assist in diagnosis and management of assessed problem(s)] : EKG obtained to assist in diagnosis and management of assessed problem(s)

## 2023-08-15 ENCOUNTER — APPOINTMENT (OUTPATIENT)
Dept: CARDIOLOGY | Facility: CLINIC | Age: 23
End: 2023-08-15
Payer: COMMERCIAL

## 2023-08-15 VITALS
HEIGHT: 62 IN | WEIGHT: 109 LBS | BODY MASS INDEX: 20.06 KG/M2 | SYSTOLIC BLOOD PRESSURE: 110 MMHG | HEART RATE: 112 BPM | DIASTOLIC BLOOD PRESSURE: 70 MMHG

## 2023-08-15 DIAGNOSIS — R00.0 TACHYCARDIA, UNSPECIFIED: ICD-10-CM

## 2023-08-15 DIAGNOSIS — G90.A POSTURAL ORTHOSTATIC TACHYCARDIA SYNDROME [POTS]: ICD-10-CM

## 2023-08-15 PROCEDURE — 99214 OFFICE O/P EST MOD 30 MIN: CPT

## 2023-08-15 PROCEDURE — 93000 ELECTROCARDIOGRAM COMPLETE: CPT

## 2023-08-15 RX ORDER — MIDODRINE HYDROCHLORIDE 5 MG/1
5 TABLET ORAL TWICE DAILY
Refills: 0 | Status: ACTIVE | COMMUNITY

## 2023-08-15 RX ORDER — MIDODRINE HYDROCHLORIDE 5 MG/1
5 TABLET ORAL 3 TIMES DAILY
Qty: 90 | Refills: 5 | Status: ACTIVE | COMMUNITY
Start: 2023-08-15 | End: 1900-01-01

## 2023-08-15 NOTE — REASON FOR VISIT
[FreeTextEntry1] : 21 year old WF diagnosed with Postural orthostatic hypotension after a syncopal event last year where she was admitted to [facility] and was placed on nadolol presents for Cardiology evaluation. She was still symptomatic with a rapid HR on the nadolol and she was placed on metoprolol ER with improvement. She presents for evaluation after being seen in an Urgent Care center with a rapid HR and was found to have sinus tachycardia. She was advised to be seen by the physician at the Urgent care center. Her EKG performed there was unchanged from her baseline EKG's.

## 2023-08-15 NOTE — HISTORY OF PRESENT ILLNESS
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

## 2023-08-15 NOTE — DISCUSSION/SUMMARY
[FreeTextEntry1] : She is tolerating the metoprolol and her HR has imroved with it. Midodrine 5 mg prn for moments when her BP is markedly decreased below 90 mmHg systolic. Jobst compression stockings. Patient was encouraged to drink fluids on a daily basis. The patient was advised to maintain her present medications. She was advised to lower her T. cholesterol level to less than 200 mg/dl and LDL to less than 100 mg/dl. Start an exercise program. Maintain a low fat, low cholesterol diet. CBC BMP lipid and hepatic panel, TSH Repeat a TTE RV in 2 weeks  [EKG obtained to assist in diagnosis and management of assessed problem(s)] : EKG obtained to assist in diagnosis and management of assessed problem(s)

## 2023-09-01 ENCOUNTER — APPOINTMENT (OUTPATIENT)
Dept: CARDIOLOGY | Facility: CLINIC | Age: 23
End: 2023-09-01
Payer: COMMERCIAL

## 2023-09-01 PROCEDURE — 93306 TTE W/DOPPLER COMPLETE: CPT

## 2024-03-23 ENCOUNTER — RX RENEWAL (OUTPATIENT)
Age: 24
End: 2024-03-23

## 2024-03-23 RX ORDER — METOPROLOL SUCCINATE 25 MG/1
25 TABLET, EXTENDED RELEASE ORAL DAILY
Qty: 90 | Refills: 3 | Status: ACTIVE | COMMUNITY
Start: 2023-04-07 | End: 1900-01-01

## 2024-04-04 ENCOUNTER — APPOINTMENT (OUTPATIENT)
Dept: CARDIOLOGY | Facility: CLINIC | Age: 24
End: 2024-04-04

## 2025-03-06 ENCOUNTER — NON-APPOINTMENT (OUTPATIENT)
Age: 25
End: 2025-03-06

## 2025-03-18 ENCOUNTER — RX RENEWAL (OUTPATIENT)
Age: 25
End: 2025-03-18

## 2025-04-12 ENCOUNTER — NON-APPOINTMENT (OUTPATIENT)
Age: 25
End: 2025-04-12

## 2025-08-08 ENCOUNTER — NON-APPOINTMENT (OUTPATIENT)
Age: 25
End: 2025-08-08